# Patient Record
Sex: MALE | Employment: FULL TIME | URBAN - METROPOLITAN AREA
[De-identification: names, ages, dates, MRNs, and addresses within clinical notes are randomized per-mention and may not be internally consistent; named-entity substitution may affect disease eponyms.]

---

## 2022-06-24 ENCOUNTER — HOSPITAL ENCOUNTER (OUTPATIENT)
Dept: PHYSICAL THERAPY | Age: 57
Setting detail: RECURRING SERIES
Discharge: HOME | End: 2022-06-27
Payer: OTHER GOVERNMENT

## 2022-06-24 PROCEDURE — 97161 PT EVAL LOW COMPLEX 20 MIN: CPT

## 2022-06-24 PROCEDURE — 97140 MANUAL THERAPY 1/> REGIONS: CPT

## 2022-06-24 ASSESSMENT — PAIN SCALES - GENERAL: PAINLEVEL_OUTOF10: 3

## 2022-06-24 NOTE — PROGRESS NOTES
Yesi Hair  : 1965  Primary: Kristi Verduzco   Secondary:  [address] @ [place]  Phone: [phone]  Fax: [phone] Plan Frequency: 1x per week for 90 days    Plan of Care/Certification Expiration Date: 22      PT Visit Info:   No data recorded    OUTPATIENT PHYSICAL THERAPY:OP NOTE TYPE: Treatment Note 2022       Episode  }Appt Desk              Treatment Diagnosis:  Pain in Right Wrist (M25.531)  Generalized Muscle Weakness (M62.81) Medial Epicondylitis, right elbow (M77.01)    Medical/Referring Diagnosis:  Medial epicondylitis, right elbow [M77.01]  Referring Physician:  Isela Oconnell MD MD Orders:  PT Eval and Treat   Date of Onset:  Onset Date: 22     Allergies:   Patient has no allergy information on record. Restrictions/Precautions:  Restrictions/Precautions: None  No data recorded   Interventions Planned (Treatment may consist of any combination of the following):    Current Treatment Recommendations: Strengthening; ROM; Functional mobility training; ADL/Self-care training; Manual Therapy - Soft Tissue Mobilization; Manual Therapy - Joint Manipulation; Pain management; Home exercise program; Equipment evaluation, education, & procurement; Modalities; Positioning; Integrated dry needling     Subjective Comments: John Paul Gambino reports his elbow has been bothering him since March. Initial:}    3/10Post Session:       2/10  Medications Last Reviewed:  2022  Updated Objective Findings:  See evaluation note from today  Treatment   THERAPEUTIC EXERCISE: (5 minutes):    Exercises per grid below to improve mobility and strength. Required minimal visual, verbal and manual cues to promote proper body alignment, promote proper body posture and promote proper body mechanics. Progressed resistance, range, repetitions and complexity of movement as indicated.     MANUAL THERAPY: (15 minutes):   Joint mobilization and Soft tissue mobilization was utilized and necessary because of the patient's painful spasm, loss of articular motion and restricted motion of soft tissue. - Soft tissue mobilization, IASTM to forearm flexors      Date:  6/24/2022   Activity/Exercise Parameters   Patient Education Plan of care, HEP review   Eccentrics Review    Ice massage Review   Self soft tissue  Review   Band biceps/triceps Red; 2 x 10 each                 Treatment/Session Summary:    · Treatment Assessment: Nani Mary has good tolerance to initial bout of manual therapy to medial forearm flexors today. · Communication/Consultation:  None today  · Equipment provided today:  None  · Recommendations/Intent for next treatment session: Next visit will focus on improving pain, strength and mobility.     Total Treatment Billable Duration:  15 minutes manual therapy, 5 minutes exercise (40 minute evaluation)   Time In: 0930  Time Out: [facility], PT       Charge Capture  }Post Session Pain  PT Visit Info  [employer] Portal  MD Guidelines  Scanned Media  Benefits  MyChart    Future Appointments   Date Time Provider Oswaldo Brooks   7/6/2022  2:00 PM Lily Mart, PT [facility]   7/14/2022  9:30 AM Lily Mart, PT [facility]   7/20/2022  8:00 AM Lily Mart, PT [facility]   7/28/2022  8:30 AM Lily Mart, PT [facility]

## 2022-06-24 NOTE — THERAPY EVALUATION
Josephine Cisneros  : 1965  Primary: Brandan Aden   Secondary:  [address] @ [place] [place]   Phone: [phone]  Fax: [phone] Plan Frequency: 1x per week for 90 days    Plan of Care/Certification Expiration Date: 22      PT Visit Info:    No data recorded    OUTPATIENT PHYSICAL THERAPY:OP NOTE TYPE: Initial Assessment 2022               Episode  Appt Desk         Treatment Diagnosis:  Pain in Right Wrist (M25.531)  Generalized Muscle Weakness (M62.81) Medial Epicondylitis, right elbow (M77.01)    Medical/Referring Diagnosis:  Medial epicondylitis, right elbow [M77.01]  Referring Physician:  Eleazar Harvey MD MD Orders:  PT Eval and Treat   Return MD Appt:  TBD  Date of Onset:  Onset Date: 22     Allergies:  Patient has no allergy information on record. Restrictions/Precautions:    Restrictions/Precautions: None  Medications Last Reviewed:  2022     SUBJECTIVE   History of Injury/Illness (Reason for Referral): Geri Maldonado presents with 3 month history or right elbow pain with symptoms consistent with medial epicondylitis. He reports increase in his golf volume over this time which he attributes to his symptoms. He currently has difficulty lifting, driving, opening jars and playing golf but has found stretching, massage and eccentrics to be helpful. Patient Stated Goal(s):  \"Return to full function\"  Initial:     3/10 Post Session:     2/10  Past Medical History/Comorbidities:   Mr. Filiberto Mcintosh  has no past medical history on file. Mr. Filiberto Mcintosh  has no past surgical history on file.   Social History/Living Environment:   Lives With: Spouse     Prior Level of Function/Work/Activity:   Prior level of function: Independent  Type of Occupation: Pediatrician  No data recordedNo data recorded   Learning:   Does the patient/guardian have any barriers to learning?: No barriers  Will there be a co-learner?: No  What is the preferred language of the patient/guardian?: English  Is an  required?: No  How does the patient/guardian prefer to learn new concepts?: Listening; Reading; Demonstration; Pictures/Videos     Fall Risk Scale: Total Score: 0  Camacho Fall Risk: Low (0-24)     Dominant Side:  right handed        OBJECTIVE       Palpation: Patient is tender at proximal and distal insertion of forearm musculature of right UE. Strength:       RIGHT  LEFT     Wrist Flexion 4-/5, painful 5/5    Shoulder ER 5/5 5/5    Shoulder IR 4/5, painful 5/5     Elbow flexion 4-/5, painful  5/5          Functional Mobility:       RIGHT LEFT    Gripping Painful Normal    Lifting Painful Normal    Push Normal Normal    Pull Slight discomfort Normal    Carry Slight discomfort            Joint Mobility:        RIGHT LEFT     Elbow Normal Normal               ASSESSMENT   Initial Assessment: Arnaldo Khoury presents with right elbow medial epicondylitis which began in March 2022 after increasing his golf playing volume. His chief complaint is pain but also presents with decreased strength, mobility and flexibility which limits his overall ADL function. He will benefit from skilled therapy to improve his pain, strength and mobility to return to prior level of function. Problem List: (Impacting functional limitations): Body Structures, Functions, Activity Limitations Requiring Skilled Therapeutic Intervention: Decreased functional mobility ; Decreased ADL status; Decreased ROM; Decreased strength; Decreased endurance; Decreased high-level IADLs; Decreased fine motor control;  Increased pain     Therapy Prognosis:   Therapy Prognosis: Excellent     Assessment Complexity:   Low Complexity  PLAN   Effective Dates: 6/24/22 TO Plan of Care/Certification Expiration Date: 09/22/22     Frequency/Duration: Plan Frequency: 1x per week for 90 days     Interventions Planned (Treatment may consist of any combination of the following):    Current Treatment Recommendations: Strengthening; ROM; Functional mobility training; ADL/Self-care training; Manual Therapy - Soft Tissue Mobilization; Manual Therapy - Joint Manipulation; Pain management; Home exercise program; Equipment evaluation, education, & procurement; Modalities; Positioning; Integrated dry needling     GOALS: (Goals have been discussed and agreed upon with patient.)  Discharge Goals: Time Frame: 90 days    Goal Progressing Met Not Met   Patient will be independent with home exercise program without assistance from therapist [x] [] []   Patient will report Quick DASH score to 20/55 or less to demonstrate improved functional capacity [x] [] []   Patient will demonstrate pain free lifting to resume normal ADL tasks without difficulty. [x] [] []   Patient will perform gripping and carrying tasks without pain to demonstrate improved functional mobility [x] [] []                Outcome Measure: Tool Used: Disabilities of the Arm, Shoulder and Hand (DASH) Questionnaire - Quick Version  Score:  Initial: 26/55 (6/24/22)  Most Recent: X/55 (Date: -- )   Interpretation of Score: The DASH is designed to measure the activities of daily living in person's with upper extremity dysfunction or pain. Each section is scored on a 1-5 scale, 5 representing the greatest disability. The scores of each section are added together for a total score of 55. Medical Necessity:    Patient is expected to demonstrate progress in strength and range of motion to increase independence with ADL tasks. Reason For Services/Other Comments:   Patient continues to require present interventions due to patient's inability to lift,  or carry without pain. Total Duration:  Time In: 0930  Time Out: 56    Regarding Kim Doyle's therapy, I certify that the treatment plan above will be carried out by a therapist or under their direction.   Thank you for this referral,  Ricardo Hernandez [address], PT     Referring Physician Signature: Reanna Wilson MD _______________________________ Date _____________        Post Session Pain  Charge Capture  PT Visit Info  POC Link  Treatment Note Link  MD Guidelines  Ludwig

## 2022-07-06 ENCOUNTER — HOSPITAL ENCOUNTER (OUTPATIENT)
Dept: PHYSICAL THERAPY | Age: 57
Setting detail: RECURRING SERIES
Discharge: HOME | End: 2022-07-09
Payer: OTHER GOVERNMENT

## 2022-07-06 PROCEDURE — 97140 MANUAL THERAPY 1/> REGIONS: CPT

## 2022-07-06 PROCEDURE — 97110 THERAPEUTIC EXERCISES: CPT

## 2022-07-06 ASSESSMENT — PAIN SCALES - GENERAL: PAINLEVEL_OUTOF10: 2

## 2022-07-06 NOTE — PROGRESS NOTES
Jefm Batsheva  : 1965  Primary: Becky Dsouza   Secondary:  [address] @ [place]   Phone: [phone]  Fax: [phone] Plan Frequency: 1x per week for 90 days    Plan of Care/Certification Expiration Date: 22      PT Visit Info:   No data recorded    OUTPATIENT PHYSICAL THERAPY:OP NOTE TYPE: Treatment Note 2022       Episode  }Appt Desk              Treatment Diagnosis:  Pain in Right Wrist (M25.531)  Generalized Muscle Weakness (M62.81) Medial Epicondylitis, right elbow (M77.01)    Medical/Referring Diagnosis:  Medial epicondylitis, right elbow [M77.01]  Referring Physician:  Virginia Simons MD MD Orders:  PT Eval and Treat   Date of Onset:  Onset Date: 22     Allergies:   Patient has no allergy information on record. Restrictions/Precautions:  Restrictions/Precautions: None  No data recorded   Interventions Planned (Treatment may consist of any combination of the following):    Current Treatment Recommendations: Strengthening; ROM; Functional mobility training; ADL/Self-care training; Manual Therapy - Soft Tissue Mobilization; Manual Therapy - Joint Manipulation; Pain management; Home exercise program; Equipment evaluation, education, & procurement; Modalities; Positioning; Integrated dry needling     Subjective Comments: Brittaney So reports slight decrease in his symptoms since his last visit. Initial:}    2/10Post Session:       1/10  Medications Last Reviewed:  2022  Updated Objective Findings:  None Today  Treatment   THERAPEUTIC EXERCISE: (30 minutes):    Exercises per grid below to improve mobility and strength. Required minimal visual, verbal and manual cues to promote proper body alignment, promote proper body posture and promote proper body mechanics. Progressed resistance, range, repetitions and complexity of movement as indicated.     MANUAL THERAPY: (15 minutes):   Joint mobilization and Soft tissue mobilization was utilized and necessary because of the patient's painful spasm, loss of articular motion and restricted motion of soft tissue. - Soft tissue mobilization, IASTM to forearm flexors      Date:  7/6/2022   Activity/Exercise Parameters   Patient Education Plan of care, HEP review   Eccentrics Review    Ice massage Review   Self soft tissue  Review   Band biceps/triceps Red; 2 x 10 each   Stab dragon 2 x 10 each direction   Supination/pronation Golf club, hammer x 10 each   Theraband flexbar Red, 2 min   PNF D2 extension #7.5; 2 x 10         Treatment/Session Summary:    · Treatment Assessment: Coleman Albarado is able to perform more supination/pronation movements with less discomfort but still has more pain moving from lengthen to shortened positions. · Communication/Consultation:  None today  · Equipment provided today:  None  · Recommendations/Intent for next treatment session: Next visit will focus on improving pain, strength and mobility.     Total Treatment Billable Duration:  45 minutes  Time In: 1400  Time Out: [address], PT       Charge Capture  }Post Session Pain  PT Visit Info  [employer] Portal  MD Guidelines  Scanned Media  Benefits  MyChart    Future Appointments   Date Time Provider Oswaldo Brooks   7/14/2022  9:30 AM Flor Weaver PT [facility]   7/20/2022  8:00 AM GUNNAR Fisher [facility]   7/28/2022  8:30 AM [place]AR [facility]

## 2022-07-14 ENCOUNTER — HOSPITAL ENCOUNTER (OUTPATIENT)
Dept: PHYSICAL THERAPY | Age: 57
Setting detail: RECURRING SERIES
Discharge: HOME | End: 2022-07-17
Payer: OTHER GOVERNMENT

## 2022-07-14 PROCEDURE — 97110 THERAPEUTIC EXERCISES: CPT

## 2022-07-14 PROCEDURE — 97140 MANUAL THERAPY 1/> REGIONS: CPT

## 2022-07-14 ASSESSMENT — PAIN SCALES - GENERAL: PAINLEVEL_OUTOF10: 2

## 2022-07-14 NOTE — PROGRESS NOTES
Evangelista Sanchez  : 1965  Primary: Jose G Marie   Secondary:  Joycelyn Dudley @ [place]  Phone: [phone]  Fax: [phone] Plan Frequency: 1x per week for 90 days    Plan of Care/Certification Expiration Date: 22      PT Visit Info:   No data recorded    OUTPATIENT PHYSICAL THERAPY:OP NOTE TYPE: Treatment Note 2022       Episode  }Appt Desk              Treatment Diagnosis:  Pain in Right Wrist (M25.531)  Generalized Muscle Weakness (M62.81) Medial Epicondylitis, right elbow (M77.01)    Medical/Referring Diagnosis:  Medial epicondylitis, right elbow [M77.01]  Referring Physician:  Deny Alonzo MD MD Orders:  PT Eval and Treat   Date of Onset:  Onset Date: 22     Allergies:   Patient has no allergy information on record. Restrictions/Precautions:  Restrictions/Precautions: None  No data recorded   Interventions Planned (Treatment may consist of any combination of the following):    Current Treatment Recommendations: Strengthening; ROM; Functional mobility training; ADL/Self-care training; Manual Therapy - Soft Tissue Mobilization; Manual Therapy - Joint Manipulation; Pain management; Home exercise program; Equipment evaluation, education, & procurement; Modalities; Positioning; Integrated dry needling     Subjective Comments: Kelly Isaacs continues to report slow progress in soreness at medial elbow. Initial:}    2/10Post Session:       1/10  Medications Last Reviewed:  2022  Updated Objective Findings:  None Today  Treatment   THERAPEUTIC EXERCISE: (30 minutes):    Exercises per grid below to improve mobility and strength. Required minimal visual, verbal and manual cues to promote proper body alignment, promote proper body posture and promote proper body mechanics. Progressed resistance, range, repetitions and complexity of movement as indicated.     MANUAL THERAPY: (25 minutes):   Joint mobilization and Soft tissue mobilization was utilized and necessary because of the patient's painful spasm, loss of articular motion and restricted motion of soft tissue. - Soft tissue mobilization, IASTM to forearm flexors      Date:  7/14/2022   Activity/Exercise Parameters   Patient Education Plan of care, HEP review   Eccentrics Review    Ice massage Review   Self soft tissue  Review   Biceps Curl Blue band, slow, 2 x 10   Triceps push down #10; slow, 2 x 10   Latt Pull down #25; 2 x 10   Theraband flexbar Red, 2 min   TRX Row Slow; 2 x 10   Carries #22; 20 ft x 5         Treatment/Session Summary:    · Treatment Assessment: Geri Edmonds has good tolerance to more weighted carries and slow, heavy biceps/triceps work today. · Communication/Consultation:  None today  · Equipment provided today:  None  · Recommendations/Intent for next treatment session: Next visit will focus on improving pain, strength and mobility.     Total Treatment Billable Duration:  55 minutes  Time In: 0930  Time Out: [facility], PT       Charge Capture  }Post Session Pain  PT Visit Info  [employer] Portal  MD Guidelines  Scanned Media  Benefits  MyChart    Future Appointments   Date Time Provider Oswaldo Brooks   7/20/2022  8:00 AM Alfredo Mckeon, PT [facility]   7/28/2022  8:30 AM Alfredo Mckeon PT [facility]

## 2022-07-20 ENCOUNTER — HOSPITAL ENCOUNTER (OUTPATIENT)
Dept: PHYSICAL THERAPY | Age: 57
Setting detail: RECURRING SERIES
Discharge: HOME | End: 2022-07-23
Payer: OTHER GOVERNMENT

## 2022-07-20 PROCEDURE — 97140 MANUAL THERAPY 1/> REGIONS: CPT

## 2022-07-20 PROCEDURE — 97110 THERAPEUTIC EXERCISES: CPT

## 2022-07-20 ASSESSMENT — PAIN SCALES - GENERAL: PAINLEVEL_OUTOF10: 2

## 2022-07-20 NOTE — PROGRESS NOTES
Concha Montes  : 1965  Primary: Estela Mahoney   Secondary:  [address] @ [place]   Phone: [phone]  Fax: [phone] Plan Frequency: 1x per week for 90 days    Plan of Care/Certification Expiration Date: 22      PT Visit Info:   No data recorded    OUTPATIENT PHYSICAL THERAPY:OP NOTE TYPE: Treatment Note 2022       Episode  }Appt Desk              Treatment Diagnosis:  Pain in Right Wrist (M25.531)  Generalized Muscle Weakness (M62.81) Medial Epicondylitis, right elbow (M77.01)    Medical/Referring Diagnosis:  Medial epicondylitis, right elbow [M77.01]  Referring Physician:  Madison Cifuentes MD MD Orders:  PT Eval and Treat   Date of Onset:  Onset Date: 22     Allergies:   Patient has no allergy information on record. Restrictions/Precautions:  Restrictions/Precautions: None  No data recorded   Interventions Planned (Treatment may consist of any combination of the following):    Current Treatment Recommendations: Strengthening; ROM; Functional mobility training; ADL/Self-care training; Manual Therapy - Soft Tissue Mobilization; Manual Therapy - Joint Manipulation; Pain management; Home exercise program; Equipment evaluation, education, & procurement; Modalities; Positioning; Integrated dry needling     Subjective Comments: Joya Michelle reports minimal progress since last visit. Initial:}    2/10Post Session:       1/10  Medications Last Reviewed:  2022  Updated Objective Findings:  None Today  Treatment   THERAPEUTIC EXERCISE: (30 minutes):    Exercises per grid below to improve mobility and strength. Required minimal visual, verbal and manual cues to promote proper body alignment, promote proper body posture and promote proper body mechanics. Progressed resistance, range, repetitions and complexity of movement as indicated.     MANUAL THERAPY: (25 minutes):   Joint mobilization and Soft tissue mobilization was utilized and necessary because of the patient's painful spasm, loss of articular motion and restricted motion of soft tissue. - Soft tissue mobilization, IASTM to forearm flexors      Date:  7/20/2022   Activity/Exercise Parameters   Patient Education Plan of care, HEP review   Eccentrics Review    Ice massage Review   Self soft tissue  Review   Biceps Curl --   Face Pull #17; 3 x 10   Gripping tasks Review x 2 min   Theraband flexbar Red, 2 min   TRX Row Slow; 2 x 10   Carries #22; 20 ft x 5; ; #8.8, 20 ft x 5         Treatment/Session Summary:    Treatment Assessment: Amadou Osgood with minimal discomfort with pulling and carrying tasks today. Communication/Consultation:  None today  Equipment provided today:  None  Recommendations/Intent for next treatment session: Next visit will focus on improving pain, strength and mobility.     Total Treatment Billable Duration:  55 minutes  Time In: 0800  Time Out: 0900    STONEY BRADLEY, PT       Charge Capture  }Post Session Pain  PT Visit Info  [employer] Portal  MD Guidelines  Scanned Media  Benefits  MyChart    Future Appointments   Date Time Provider Oswaldo Brooks   8/16/2022  1:30 PM Agustín Lubin, PT [facility]

## 2022-08-16 ENCOUNTER — HOSPITAL ENCOUNTER (OUTPATIENT)
Dept: PHYSICAL THERAPY | Age: 57
Setting detail: RECURRING SERIES
Discharge: HOME | End: 2022-08-19
Payer: OTHER GOVERNMENT

## 2022-08-16 PROCEDURE — 97110 THERAPEUTIC EXERCISES: CPT

## 2022-08-16 NOTE — PROGRESS NOTES
Edwar Velásquez  : 1965  Primary: Diann Bocanegra   Secondary:  Erin Pringle @ [place]   Phone: [phone]  Fax: [phone] Plan Frequency: 1x per week for 90 days    Plan of Care/Certification Expiration Date: 22      PT Visit Info:   No data recorded    OUTPATIENT PHYSICAL THERAPY:OP NOTE TYPE: Treatment Note 2022       Episode  }Appt Desk              Treatment Diagnosis:  Pain in Right Wrist (M25.531)  Generalized Muscle Weakness (M62.81) Medial Epicondylitis, right elbow (M77.01)    Medical/Referring Diagnosis:  Medial epicondylitis, right elbow [M77.01]  Referring Physician:  Gurvinder Mcgee MD MD Orders:  PT Eval and Treat   Date of Onset:  Onset Date: 22     Allergies:   Patient has no allergy information on record. Restrictions/Precautions:  Restrictions/Precautions: None  No data recorded   Interventions Planned (Treatment may consist of any combination of the following):    Current Treatment Recommendations: Strengthening; ROM; Functional mobility training; ADL/Self-care training; Manual Therapy - Soft Tissue Mobilization; Manual Therapy - Joint Manipulation; Pain management; Home exercise program; Equipment evaluation, education, & procurement; Modalities; Positioning; Integrated dry needling     Subjective Comments: Stephanie Edwards says he's back to doing mostly normal activities without discomfort. Initial:}    1/10Post Session:       0/10  Medications Last Reviewed:  2022  Updated Objective Findings:  See evaluation note from today  Treatment   THERAPEUTIC EXERCISE: (30 minutes):    Exercises per grid below to improve mobility and strength. Required minimal visual, verbal and manual cues to promote proper body alignment, promote proper body posture and promote proper body mechanics. Progressed resistance, range, repetitions and complexity of movement as indicated.     MANUAL THERAPY: (None Today): Joint mobilization and Soft tissue mobilization was utilized and necessary because of the patient's painful spasm, loss of articular motion and restricted motion of soft tissue. - Soft tissue mobilization, IASTM to forearm flexors      Date:  8/16/2022   Activity/Exercise Parameters   Patient Education Plan of care, HEP review   Eccentrics Review    Ice massage Review   Self soft tissue  Review   Biceps Curl --   Face Pull #17; 3 x 10   Gripping tasks Review x 2 min   Theraband flexbar Red, 2 min   TRX Row Slow; 2 x 10   Carries #22; 20 ft x 5; ; #8.8, 20 ft x 5         Treatment/Session Summary:    Treatment Assessment: Curtis Carreno has made great progress and will continue with independent HEP at this time. Communication/Consultation:  None today  Equipment provided today:  None  Recommendations: Discharge from PT    Total Treatment Billable Duration:  30 minutes  Time In: 1630  Time Out: [address] [place], PT       Charge Capture  }Post Session Pain  PT Visit Info  [employer] Portal  MD Guidelines  Scanned Media  Benefits  MyChart    No future appointments.

## 2022-08-16 NOTE — THERAPY DISCHARGE
Patricia Patton  : 1965  Primary: Georgie Álvarez   Secondary:  [address] @ [place]  Phone: [phone]  Fax: [phone] Plan Frequency: 1x per week for 90 days    Plan of Care/Certification Expiration Date: 22      PT Visit Info:    No data recorded    OUTPATIENT PHYSICAL THERAPY:OP NOTE TYPE: Initial Assessment 2022               Episode  Appt Desk         Treatment Diagnosis:  Pain in Right Wrist (M25.531)  Generalized Muscle Weakness (M62.81) Medial Epicondylitis, right elbow (M77.01)    Medical/Referring Diagnosis:  Medial epicondylitis, right elbow [M77.01]  Referring Physician:  Wagner Peralta MD MD Orders:  PT Eval and Treat   Return MD Appt:  TBD  Date of Onset:  Onset Date: 22     Allergies:  Patient has no allergy information on record. Restrictions/Precautions:    Restrictions/Precautions: None  Medications Last Reviewed:  2022           OBJECTIVE       Palpation: Normal            Strength:       RIGHT  LEFT     Wrist Flexion 5/5 5/5    Shoulder ER 5/5 5/5    Shoulder IR 5/5 5/5     Elbow flexion 5/5 5/5          Functional Mobility:       RIGHT LEFT    Gripping Normal Normal    Lifting Normal Normal    Push Normal Normal    Pull Normal Normal    Carry Normal Normal          Joint Mobility:        RIGHT LEFT     Elbow Normal Normal               ASSESSMENT   Initial Assessment: Namrata Palmer presents with right elbow medial epicondylitis which began in 2022 after increasing his golf playing volume. His chief complaint is pain but also presents with decreased strength, mobility and flexibility which limits his overall ADL function. He will benefit from skilled therapy to improve his pain, strength and mobility to return to prior level of function. 22: Namrata Palmer has made great progress and will continue with HEP at this time. Problem List: (Impacting functional limitations):     Body Structures, Functions, Activity Limitations Requiring Skilled Therapeutic Intervention: Decreased functional mobility ; Decreased ADL status; Decreased ROM; Decreased strength; Decreased endurance; Decreased high-level IADLs; Decreased fine motor control; Increased pain          PLAN   Effective Dates: 6/24/22 TO Plan of Care/Certification Expiration Date: 09/22/22     Frequency/Duration: Plan Frequency: 1x per week for 90 days     Interventions Planned (Treatment may consist of any combination of the following):    Current Treatment Recommendations: Strengthening; ROM; Functional mobility training; ADL/Self-care training; Manual Therapy - Soft Tissue Mobilization; Manual Therapy - Joint Manipulation; Pain management; Home exercise program; Equipment evaluation, education, & procurement; Modalities; Positioning; Integrated dry needling     GOALS: (Goals have been discussed and agreed upon with patient.)  Discharge Goals: Time Frame: 90 days    Goal Progressing Met Not Met   Patient will be independent with home exercise program without assistance from therapist [] [x] []   Patient will report Quick DASH score to 20/55 or less to demonstrate improved functional capacity [] [x] []   Patient will demonstrate pain free lifting to resume normal ADL tasks without difficulty. [] [x] []   Patient will perform gripping and carrying tasks without pain to demonstrate improved functional mobility [] [x] []                Outcome Measure: Tool Used: Disabilities of the Arm, Shoulder and Hand (DASH) Questionnaire - Quick Version  Score:  Initial: 26/55 (6/24/22)  Most Recent: 15/55 (Date: 8/16/22 )   Interpretation of Score: The DASH is designed to measure the activities of daily living in person's with upper extremity dysfunction or pain. Each section is scored on a 1-5 scale, 5 representing the greatest disability. The scores of each section are added together for a total score of 55.           Total Duration:  Time In: 1630  Time Out: 1700    Regarding Medardontirma Elkin Doyle's therapy, I certify that the treatment plan above will be carried out by a therapist or under their direction. Thank you for this referral,  Ricardo Hernandez 112, PT     Referring Physician Signature: Renetta Palmer MD No Signature is Required for this note.         Post Session Pain  Charge Capture  PT Visit Info  POC Link  Treatment Note Link  MD Guidelines  MyChart

## 2022-08-17 ASSESSMENT — PAIN SCALES - GENERAL: PAINLEVEL_OUTOF10: 1

## 2023-11-15 ENCOUNTER — APPOINTMENT (OUTPATIENT)
Dept: PHYSICAL THERAPY | Age: 58
End: 2023-11-15
Payer: OTHER GOVERNMENT

## 2023-11-17 ENCOUNTER — HOSPITAL ENCOUNTER (OUTPATIENT)
Dept: PHYSICAL THERAPY | Age: 58
Setting detail: RECURRING SERIES
Discharge: HOME | End: 2023-11-20
Payer: OTHER GOVERNMENT

## 2023-11-17 DIAGNOSIS — M62.81 MUSCLE WEAKNESS (GENERALIZED): ICD-10-CM

## 2023-11-17 DIAGNOSIS — M25.621 STIFFNESS OF RIGHT ELBOW, NOT ELSEWHERE CLASSIFIED: ICD-10-CM

## 2023-11-17 DIAGNOSIS — M25.521 PAIN IN RIGHT ELBOW: Primary | ICD-10-CM

## 2023-11-17 PROCEDURE — 97110 THERAPEUTIC EXERCISES: CPT

## 2023-11-17 PROCEDURE — 97161 PT EVAL LOW COMPLEX 20 MIN: CPT

## 2023-11-17 ASSESSMENT — PAIN SCALES - GENERAL: PAINLEVEL_OUTOF10: 2

## 2023-11-17 NOTE — THERAPY EVALUATION
Tea Isidro  : 1965  Primary: Cheryl Nalini (Other)  Secondary:  [address] @ [place] ,  [address] [place] [address]  Phone: [phone]  Fax: [phone] Plan Frequency: 2x per week for 90 days    Plan of Care/Certification Expiration Date: 02/15/24      PT Visit Info:  Plan Frequency: 2x per week for 90 days  Plan of Care/Certification Expiration Date: 02/15/24      Visit Count:  1                OUTPATIENT PHYSICAL THERAPY:             Initial Assessment 2023               Episode (Right elbow pain) Appt Desk         Treatment Diagnosis:    Pain in right elbow  Stiffness of right elbow, not elsewhere classified  Muscle weakness (generalized)  Medical/Referring Diagnosis:    Right elbow pain [M25.521]  Referring Physician:  JLUIS Vazquez MD Orders:  PT Eval and Treat   Return MD Appt:  TBD  Date of Onset:  Onset Date: 10/24/23      Allergies:  Patient has no allergy information on record. Restrictions/Precautions:    Restrictions/Precautions: None      Medications Last Reviewed:  2023     SUBJECTIVE   History of Injury/Illness (Reason for Referral): Sarthak Garcia presents status post right elbow open medial epicondyle debridement on 10/24/23. He reports long history of right elbow pain and after failed PT, injections, PRP treatment he elected to have surgery a few weeks ago. He reports things have gone well since surgery but still has some pain/soreness with certain movements. Patient Stated Goal(s):  \"Back to golf; preferably by 2024\"  Initial:     2/10 Post Session:      /10  Past Medical History/Comorbidities:   Mr. Merrell Goldmann  has no past medical history on file. Mr. Merrell Goldmann  has no past surgical history on file.   Social History/Living Environment:   Lives With: Spouse     Prior Level of Function/Work/Activity:   Occupation: Full time employment  Type of Occupation: Pediatrician       Learning:   Does the patient/guardian have any

## 2023-11-17 NOTE — PROGRESS NOTES
Yu Cortez  : 1965  Primary: Malika Espinoza (Other)  Secondary:  [address] @ [address] ,  [address] [place] [address]  Phone: [phone]  Fax: [phone] Plan Frequency: 2x per week for 90 days  Plan of Care/Certification Expiration Date: 02/15/24      >PT Visit Info:  Plan Frequency: 2x per week for 90 days  Plan of Care/Certification Expiration Date: 02/15/24      Visit Count:  1    OUTPATIENT PHYSICAL THERAPY: Treatment Note 2023       Episode  }Appt Desk             Treatment Diagnosis:    Pain in right elbow  Stiffness of right elbow, not elsewhere classified  Muscle weakness (generalized)  Medical/Referring Diagnosis:    Right elbow pain [M25.521]  Referring Physician:  JLUIS Heredia MD Orders:  PT Eval and Treat   Date of Onset:  Onset Date: 10/24/23     Allergies:   Patient has no allergy information on record. Restrictions/Precautions:  Restrictions/Precautions: None  No data recorded   Interventions Planned (Treatment may consist of any combination of the following):    Current Treatment Recommendations: Strengthening; ROM; Balance training; Functional mobility training; Neuromuscular re-education; Manual; Pain management; Return to work related activity; Home exercise program; Patient/Caregiver education & training; Positioning     >Subjective Comments: Shanae Murray reports things have gone well since surgery a few weeks ago. >Initial:     2/10>Post Session:        /10  Medications Last Reviewed:  2023  Updated Objective Findings:  See Evaluation Note from today  Treatment   THERAPEUTIC EXERCISE: (15 minutes):    Exercises per grid below to improve mobility and strength. Required moderate visual, verbal, manual, and tactile cues to promote proper body alignment, promote proper body posture, and promote proper body mechanics. Progressed resistance, range, repetitions, and complexity of movement as indicated.    Date:  2023

## 2023-11-21 ENCOUNTER — HOSPITAL ENCOUNTER (OUTPATIENT)
Dept: PHYSICAL THERAPY | Age: 58
Setting detail: RECURRING SERIES
Discharge: HOME | End: 2023-11-24
Payer: OTHER GOVERNMENT

## 2023-11-21 PROCEDURE — 97140 MANUAL THERAPY 1/> REGIONS: CPT

## 2023-11-21 PROCEDURE — 97110 THERAPEUTIC EXERCISES: CPT

## 2023-11-21 ASSESSMENT — PAIN SCALES - GENERAL: PAINLEVEL_OUTOF10: 2

## 2023-11-21 NOTE — PROGRESS NOTES
Mayco Ocasio  : 1965  Primary: Yann Osei (Other)  Secondary:  [address] @ [address] ,  [address] [place] [address]  Phone: [phone]  Fax: [phone] Plan Frequency: 2x per week for 90 days  Plan of Care/Certification Expiration Date: 02/15/24      >PT Visit Info:  Plan Frequency: 2x per week for 90 days  Plan of Care/Certification Expiration Date: 02/15/24      Visit Count:  2    OUTPATIENT PHYSICAL THERAPY: Treatment Note 2023       Episode  }Appt Desk             Treatment Diagnosis:    Pain in right elbow  Stiffness of right elbow, not elsewhere classified  Muscle weakness (generalized)  Medical/Referring Diagnosis:    Right elbow pain [M25.521]  Referring Physician:  JLUIS Butler MD Orders:  PT Eval and Treat   Date of Onset:  Onset Date: 10/24/23     Allergies:   Patient has no allergy information on record. Restrictions/Precautions:  Restrictions/Precautions: None  No data recorded   Interventions Planned (Treatment may consist of any combination of the following):    Current Treatment Recommendations: Strengthening; ROM; Balance training; Functional mobility training; Neuromuscular re-education; Manual; Pain management; Return to work related activity; Home exercise program; Patient/Caregiver education & training; Positioning     >Subjective Comments: Errol Serena denies minimal pain in the L elbow and good progress with daily activities. >Initial:     2/10>Post Session:       1/10  Medications Last Reviewed:  2023  Updated Objective Findings:   mild swelling present along the medial R elbow  Treatment   THERAPEUTIC EXERCISE: (40 minutes):    Exercises per grid below to improve mobility and strength. Required moderate visual, verbal, manual, and tactile cues to promote proper body alignment, promote proper body posture, and promote proper body mechanics.   Progressed resistance, range, repetitions, and complexity of movement as

## 2023-11-28 ENCOUNTER — HOSPITAL ENCOUNTER (OUTPATIENT)
Dept: PHYSICAL THERAPY | Age: 58
Setting detail: RECURRING SERIES
Discharge: HOME | End: 2023-12-01
Payer: OTHER GOVERNMENT

## 2023-11-28 PROCEDURE — 97140 MANUAL THERAPY 1/> REGIONS: CPT

## 2023-11-28 PROCEDURE — 97110 THERAPEUTIC EXERCISES: CPT

## 2023-11-28 ASSESSMENT — PAIN SCALES - GENERAL: PAINLEVEL_OUTOF10: 2

## 2023-11-28 NOTE — PROGRESS NOTES
Makenzie David  : 1965  Primary: Masha Solo (Other)  Secondary:  [address] @ [address] ,  [address] [place] [address]  Phone: [phone]  Fax: [phone] Plan Frequency: 2x per week for 90 days  Plan of Care/Certification Expiration Date: 02/15/24      >PT Visit Info:  Plan Frequency: 2x per week for 90 days  Plan of Care/Certification Expiration Date: 02/15/24      Visit Count:  3    OUTPATIENT PHYSICAL THERAPY: Treatment Note 2023       Episode  }Appt Desk             Treatment Diagnosis:    Pain in right elbow  Stiffness of right elbow, not elsewhere classified  Muscle weakness (generalized)  Medical/Referring Diagnosis:    Right elbow pain [M25.521]  Referring Physician:  JLUIS Greene MD Orders:  PT Eval and Treat   Date of Onset:  Onset Date: 10/24/23     Allergies:   Patient has no allergy information on record. Restrictions/Precautions:  Restrictions/Precautions: None  No data recorded   Interventions Planned (Treatment may consist of any combination of the following):    Current Treatment Recommendations: Strengthening; ROM; Balance training; Functional mobility training; Neuromuscular re-education; Manual; Pain management; Return to work related activity; Home exercise program; Patient/Caregiver education & training; Positioning     >Subjective Comments: Mor Donovan reports he's continued to have little discomfort but was a little sore after increasing activity last week. >Initial:     2/10>Post Session:       1/10  Medications Last Reviewed:  2023  Updated Objective Findings:  None Today  Treatment   THERAPEUTIC EXERCISE: (40 minutes):    Exercises per grid below to improve mobility and strength. Required moderate visual, verbal, manual, and tactile cues to promote proper body alignment, promote proper body posture, and promote proper body mechanics.   Progressed resistance, range, repetitions, and complexity of movement as

## 2023-12-01 ENCOUNTER — APPOINTMENT (OUTPATIENT)
Dept: PHYSICAL THERAPY | Age: 58
End: 2023-12-01
Payer: OTHER GOVERNMENT

## 2024-01-10 ENCOUNTER — HOSPITAL ENCOUNTER (OUTPATIENT)
Dept: PHYSICAL THERAPY | Age: 59
Setting detail: RECURRING SERIES
Discharge: HOME | End: 2024-01-13
Payer: OTHER GOVERNMENT

## 2024-01-10 PROCEDURE — 97110 THERAPEUTIC EXERCISES: CPT

## 2024-01-10 PROCEDURE — 97140 MANUAL THERAPY 1/> REGIONS: CPT

## 2024-01-10 ASSESSMENT — PAIN SCALES - GENERAL: PAINLEVEL_OUTOF10: 4

## 2024-01-10 NOTE — PROGRESS NOTES
Marques Doyle  : 1965  Primary:  [place] (Other)  Secondary:  [facility] [place] @ [place] [address] [place] [address]  Phone: [phone]  Fax: [phone] Plan Frequency: 2x per week for 90 days  Plan of Care/Certification Expiration Date: 02/15/24      >PT Visit Info:  Plan Frequency: 2x per week for 90 days  Plan of Care/Certification Expiration Date: 02/15/24      Visit Count:  5    OUTPATIENT PHYSICAL THERAPY: Treatment Note 1/10/2024       Episode  }Appt Desk             Treatment Diagnosis:    Pain in right elbow  Stiffness of right elbow, not elsewhere classified  Muscle weakness (generalized)  Medical/Referring Diagnosis:    Right elbow pain [M25.521]  Referring Physician:  Sidney Fernández PA MD Orders:  PT Eval and Treat   Date of Onset:  Onset Date: 10/24/23     Allergies:   Patient has no allergy information on record.  Restrictions/Precautions:  Restrictions/Precautions: None  No data recorded   Interventions Planned (Treatment may consist of any combination of the following):    Current Treatment Recommendations: Strengthening; ROM; Balance training; Functional mobility training; Neuromuscular re-education; Manual; Pain management; Return to work related activity; Home exercise program; Patient/Caregiver education & training; Positioning     >Subjective Comments: [place] reports some increased soreness with resuming more golf over the past few weeks.      >Initial:     4/10>Post Session:       2/10  Medications Last Reviewed:  1/10/2024  Updated Objective Findings:  None Today  Treatment   THERAPEUTIC EXERCISE: (40 minutes):    Exercises per grid below to improve mobility and strength.  Required moderate visual, verbal, manual, and tactile cues to promote proper body alignment, promote proper body posture, and promote proper body mechanics.  Progressed resistance, range, repetitions, and complexity of movement as indicated.   Date:  1/10/2024

## 2024-01-31 ENCOUNTER — APPOINTMENT (OUTPATIENT)
Dept: PHYSICAL THERAPY | Age: 59
End: 2024-01-31
Payer: OTHER GOVERNMENT

## 2024-05-20 ENCOUNTER — CLINICAL DOCUMENTATION (OUTPATIENT)
Dept: PHYSICAL THERAPY | Age: 59
End: 2024-05-20

## 2024-05-20 NOTE — THERAPY DISCHARGE
[facility] [place] [facility] [place]  [address] [place] [address]  Phone: [phone]  Fax: [phone]    OUTPATIENT PHYSICAL THERAPY  Discontinuation Summary 5/20/2024  Episode  Appt Desk         Marques Doyle has been seen in physical therapy for 5  visits from 11/12/23 to 1/10/24, with 0 cancellations and 0 no shows. Mr. Doyle's therapy has come to an end at this time due to: Patient did not return for/schedule additional treatment    Physical Therapy Goals:  Not Met: Reasons for goals not being achieved: self discharge    STONEY BRADLEY, PT